# Patient Record
Sex: FEMALE | Race: WHITE
[De-identification: names, ages, dates, MRNs, and addresses within clinical notes are randomized per-mention and may not be internally consistent; named-entity substitution may affect disease eponyms.]

---

## 2023-10-09 ENCOUNTER — HOSPITAL ENCOUNTER (EMERGENCY)
Dept: HOSPITAL 97 - ER | Age: 43
Discharge: HOME | End: 2023-10-09
Payer: COMMERCIAL

## 2023-10-09 VITALS — OXYGEN SATURATION: 100 %

## 2023-10-09 VITALS — TEMPERATURE: 97.7 F

## 2023-10-09 VITALS — SYSTOLIC BLOOD PRESSURE: 112 MMHG | DIASTOLIC BLOOD PRESSURE: 77 MMHG

## 2023-10-09 DIAGNOSIS — R07.89: Primary | ICD-10-CM

## 2023-10-09 DIAGNOSIS — I10: ICD-10-CM

## 2023-10-09 DIAGNOSIS — E03.9: ICD-10-CM

## 2023-10-09 DIAGNOSIS — E27.8: ICD-10-CM

## 2023-10-09 DIAGNOSIS — F41.9: ICD-10-CM

## 2023-10-09 DIAGNOSIS — R11.2: ICD-10-CM

## 2023-10-09 LAB
ALBUMIN SERPL BCP-MCNC: 3.8 G/DL (ref 3.4–5)
ALP SERPL-CCNC: 86 U/L (ref 45–117)
ALT SERPL W P-5'-P-CCNC: 58 U/L (ref 13–56)
AST SERPL W P-5'-P-CCNC: 30 U/L (ref 15–37)
BILIRUB INDIRECT SERPL-MCNC: 0.3 MG/DL (ref 0.2–0.8)
BUN BLD-MCNC: 16 MG/DL (ref 7–18)
GLUCOSE SERPLBLD-MCNC: 141 MG/DL (ref 74–106)
HCT VFR BLD CALC: 39.2 % (ref 36–45)
INR BLD: 1.07
LIPASE SERPL-CCNC: 23 U/L (ref 13–75)
LYMPHOCYTES # SPEC AUTO: 1.7 K/UL (ref 0.7–4.9)
MAGNESIUM SERPL-MCNC: 2 MG/DL (ref 1.6–2.4)
MCV RBC: 82.7 FL (ref 80–100)
NT-PROBNP SERPL-MCNC: < 5 PG/ML (ref ?–125)
PMV BLD: 7.8 FL (ref 7.6–11.3)
POTASSIUM SERPL-SCNC: 4.9 MEQ/L (ref 3.5–5.1)
RBC # BLD: 4.74 M/UL (ref 3.86–4.86)
TROPONIN I SERPL HS-MCNC: 3 PG/ML (ref ?–58.9)
TROPONIN I SERPL HS-MCNC: < 3 PG/ML (ref ?–58.9)
TSH SERPL DL<=0.05 MIU/L-ACNC: 1.97 UIU/ML (ref 0.36–3.74)
WBC # BLD AUTO: 11 THOU/UL (ref 4.3–10.9)

## 2023-10-09 PROCEDURE — 36415 COLL VENOUS BLD VENIPUNCTURE: CPT

## 2023-10-09 PROCEDURE — 83690 ASSAY OF LIPASE: CPT

## 2023-10-09 PROCEDURE — 93005 ELECTROCARDIOGRAM TRACING: CPT

## 2023-10-09 PROCEDURE — 74175 CTA ABDOMEN W/CONTRAST: CPT

## 2023-10-09 PROCEDURE — 80048 BASIC METABOLIC PNL TOTAL CA: CPT

## 2023-10-09 PROCEDURE — 85610 PROTHROMBIN TIME: CPT

## 2023-10-09 PROCEDURE — 83880 ASSAY OF NATRIURETIC PEPTIDE: CPT

## 2023-10-09 PROCEDURE — 96375 TX/PRO/DX INJ NEW DRUG ADDON: CPT

## 2023-10-09 PROCEDURE — 84703 CHORIONIC GONADOTROPIN ASSAY: CPT

## 2023-10-09 PROCEDURE — 80076 HEPATIC FUNCTION PANEL: CPT

## 2023-10-09 PROCEDURE — 83735 ASSAY OF MAGNESIUM: CPT

## 2023-10-09 PROCEDURE — 85025 COMPLETE CBC W/AUTO DIFF WBC: CPT

## 2023-10-09 PROCEDURE — 99284 EMERGENCY DEPT VISIT MOD MDM: CPT

## 2023-10-09 PROCEDURE — 96361 HYDRATE IV INFUSION ADD-ON: CPT

## 2023-10-09 PROCEDURE — 84443 ASSAY THYROID STIM HORMONE: CPT

## 2023-10-09 PROCEDURE — 71275 CT ANGIOGRAPHY CHEST: CPT

## 2023-10-09 PROCEDURE — 96374 THER/PROPH/DIAG INJ IV PUSH: CPT

## 2023-10-09 PROCEDURE — 84484 ASSAY OF TROPONIN QUANT: CPT

## 2023-10-09 NOTE — RAD REPORT
EXAM DESCRIPTION:  CT - Angio  Aorta For Dissection - 10/9/2023 2:02 pm

 

CLINICAL HISTORY:   chest abd pain

 

COMPARISON:  No comparisons

 

TECHNIQUE:  Dynamically enhanced 3 mm thick images of the chest, abdomen, and upper pelvis were obtai
aaron during administration of approximately 100mL Isovue 370 IV contrast. Sagittal and coronal reconst
ructions as well as maximal intensity projection reconstruction were generated and reviewed per an [facility] angiography protocol.

 

All CT scans are performed using dose optimization technique as appropriate and may include automated
 exposure control or mA/KV adjustment according to patient size.

 

FINDINGS:  Aorta is normal in diameter with no dissection or other acute aortic findings. Reconstruct
ion images show no significant findings.

 

Pulmonary arteries are normal as well.

 

No mass or infiltrate in the lung parenchyma. No pleural thickening, pleural effusion or pneumothorax
.

 

No abnormal mediastinal or hilar mass or lymphadenopathy seen. No chest wall mass or abnormal axillar
y lymphadenopathy.

 

Celiac, SMA and renal arteries show no suspicious findings.

 

Colonic diverticulosis. Short segment of mild wall thickening and adjacent fat stranding involving th
e distal descending/ proximal sigmoid colon, see axial images series 401 image 159 through 176. Lobul
ated right adrenal cystic 4.1 x 3.8 cm lesion with marginal calcifications. Small umbilical hernia co
ntaining fat. Retroverted uterus. Some engorgement of the uterine venous plexus particularly on the l
eft. Solid abdominal viscera and bowel show no other significant findings. No mass or abnormal lympha
denopathy.

 

 

IMPRESSION:  No acute abnormalities on CT angiogram of the aorta.

 

Short segment of mild wall thickening and adjacent fat stranding involving the distal descending/ pro
ximal sigmoid colon, may represent segmental colitis or early acute diverticulitis.

 

Other incidental findings as above, including a lobulated right adrenal 4.1 cm cystic lesion some wal
l calcifications, may represent a pseudocyst following prior adrenal hemorrhage, among other etiologi
es, favored to be benign.

## 2023-10-09 NOTE — ER
Nurse's Notes                                                                                     

 [facility]                                                                 

Name: Xiomy Day                                                                                   

Age: 43 yrs                                                                                       

Sex: Female                                                                                       

: 1980                                                                                   

MRN: [id number]                                                                                   

Arrival Date: 10/09/2023                                                                          

Time: 11:35                                                                                       

Account#: [id number]                                                                            

Bed 18                                                                                            

Private MD:                                                                                       

Diagnosis: Chest pain, unspecified;Nausea with vomiting, unspecified;Adrenal cystic lesion        

                                                                                                  

Presentation:                                                                                     

10/09                                                                                             

11:38 Chief complaint: EMS states: toned out to home for sudden onset SOB, chest pain and     eh3 

      pressure, nausea, and drowsiness. Pt vomited yellow bile en route to hospital. Recently     

      started Valsartan for HTN, and started Mounjaro and Phentermine within the last week.       

      Coronavirus screen: Vaccine status: Patient reports receiving the 2nd dose of the covid     

      vaccine. Ebola Screen: No symptoms or risks identified at this time. Initial Sepsis         

      Screen: Does the patient meet any 2 criteria? No. Patient's initial sepsis screen is        

      negative. Does the patient have a suspected source of infection? No. Patient's initial      

      sepsis screen is negative. Initial Sepsis Screen:. Risk Assessment: Do you want to hurt     

      yourself or someone else? Patient reports no desire to harm self or others. Onset of        

      symptoms was 2023.                                                              

11:38 Method Of Arrival: EMS: [facility]                                                       [facility] 

11:38 Acuity: MYESHA 2                                                                           eh3 

                                                                                                  

Triage Assessment:                                                                                

11:38 General: Appears distressed, uncomfortable, Behavior is cooperative, anxious, restless. eh3 

      Pain: Complains of pain in chest Pain does not radiate. Pain currently is 10 out of 10      

      on a pain scale. Quality of pain is described as heavy, pressure, Pain began suddenly,      

      30 min ago. Is continuous, Noted to be restless. Neuro: Level of Consciousness is           

      awake, alert, obeys commands, Oriented to person, place, time, situation, Speech is         

      normal, Facial symmetry appears normal, Pupils are PERRLA. Cardiovascular: Reports          

      chest pain, fatigue, lightheadedness, nausea, shortness of breath, vomiting, Capillary      

      refill < 3 seconds Patient's skin is warm and dry. Rhythm is sinus rhythm. Respiratory:     

      Reports shortness of breath at rest pain with respiration Airway is patent Trachea          

      midline Respiratory effort is even, labored, shallow, Respiratory pattern is regular,       

      symmetrical, Breath sounds are clear bilaterally. Onset: The symptoms/episode               

      began/occurred just prior to arrival, the patient has moderate shortness of breath. GI:     

      Abdomen is round non-distended, Reports nausea, vomiting. : No signs and/or symptoms      

      were reported regarding the genitourinary system. Derm: Skin is intact, is healthy with     

      good turgor, Skin is diaphoretic, Skin is normal, Skin temperature is warm.                 

      Musculoskeletal: Circulation, motion, and sensation intact. Range of motion: intact in      

      all extremities.                                                                            

                                                                                                  

Historical:                                                                                       

- Allergies:                                                                                      

11:38 No Known Allergies;                                                                     eh3 

- Home Meds:                                                                                      

11:38 valsartan oral [Active]; Mounjaro subcutaneous [Active]; phentermine oral [Active];     eh3 

      Synthroid Oral [Active];                                                                    

- PMHx:                                                                                           

11:38 Hypertensive disorder; Hypothyroidism; Anxiety;                                         eh3 

                                                                                                  

- Immunization history:: Adult Immunizations up to date.                                          

- Social history:: Smoking status: Patient denies any tobacco usage or history of.                

  Patient/guardian denies using alcohol.                                                          

                                                                                                  

                                                                                                  

Screenin:38 [facility] ED Fall Risk Assessment (Adult) Score/Fall Risk Level 0 - 2 = Low Risk. Abuse  eh3 

      screen: Denies threats or abuse. Denies injuries from another. Nutritional screening:       

      No deficits noted. Tuberculosis screening: No symptoms or risk factors identified.          

                                                                                                  

Assessment:                                                                                       

11:38 Reassessment: No changes from previously documented assessment. See triage assessment.  eh3 

      Cardiovascular: Rhythm is sinus rhythm.                                                     

12:00 Reassessment: Patient and/or family updated on plan of care and expected duration. Pain eh3 

      level reassessed. Pt A\T\O x 3, equal labored respirations, skin warm/dry/pink.             

12:30 Reassessment: Pt A\T\O x 3, equal labored respirations, skin warm/dry/pink.               eh3

13:00 Reassessment: Patient and/or family updated on plan of care and expected duration. Pain eh3 

      level reassessed. Patient is alert, oriented x 3, equal unlabored respirations, skin        

      warm/dry/pink.                                                                              

13:24 Reassessment: Pt sleeping, eyes closed, respirations even and unlabored, skin           eh3 

      pink/warm/dry. SpO2 88% on RA. Pt placed on 2L O2 via NC.                                   

14:00 Reassessment: Patient appears in no apparent distress at this time. Patient and/or      eh3 

      family updated on plan of care and expected duration. Pain level reassessed. Patient is     

      alert, oriented x 3, equal unlabored respirations, skin warm/dry/pink.                      

14:30 Reassessment: Patient appears in no apparent distress at this time. Patient and/or      3 

      family updated on plan of care and expected duration. Pain level reassessed. Patient is     

      alert, oriented x 3, equal unlabored respirations, skin warm/dry/pink. Patient states       

      feeling better. Patient states symptoms have improved.                                      

15:30 Reassessment: Patient appears in no apparent distress at this time. Patient and/or      3 

      family updated on plan of care and expected duration. Pain level reassessed. Patient is     

      alert, oriented x 3, equal unlabored respirations, skin warm/dry/pink.                      

16:30 Reassessment: Patient appears in no apparent distress at this time. Patient and/or      3 

      family updated on plan of care and expected duration. Pain level reassessed. Patient is     

      alert, oriented x 3, equal unlabored respirations, skin warm/dry/pink.                      

                                                                                                  

Vital Signs:                                                                                      

11:38  / 87; Pulse 95; Resp 19; Temp 97.7(O); Pulse Ox 100% on R/A; Weight 81.65 kg;    [facility] 

      Height 5 ft. 6 in. ; Pain 10/10;                                                            

12:00  / 87; Pulse 90; Resp 19; Pulse Ox 96% on R/A;                                    [facility] 

12:30  / 72; Pulse 95; Resp 18; Pulse Ox 100% on R/A;                                   [facility] 

13:00  / 94; Pulse 91; Resp 13; Pulse Ox 100% on R/A;                                   [facility] 

13:30  / 70; Pulse 91; Resp 20; Pulse Ox 100% on 2 lpm NC;                              [facility] 

14:00  / 80; Pulse 98; Resp 14; Pulse Ox 100% on 2 lpm NC;                              [facility] 

14:30  / 72; Pulse 100; Resp 18; Pulse Ox 100% on 2 lpm NC;                             [facility] 

15:00  / 70; Pulse 98; Resp 14; Pulse Ox 96% on R/A;                                    [facility] 

15:30  / 74; Pulse 102; Resp 17; Pulse Ox 99% on R/A;                                   3 

16:00  / 78; Pulse 98; Resp 16; Pulse Ox 100% on R/A;                                   eh3 

16:30  / 77; Pulse 94; Resp 16; Pulse Ox 100% on R/A;                                   eh3 

11:38 Body Mass Index 29.05 (81.65 kg, 167.64 cm)                                             eh3 

11:38 Pain Scale: Adult                                                                       eh3 

                                                                                                  

ED Course:                                                                                        

11:38 Patient arrived in ED.                                                                  rt  

11:38 Arm band placed on.                                                                     eh3 

11:38 Patient has correct armband on for positive identification. Bed in low position. Call   eh3 

      light in reach. Side rails up X2. Provided Education on: Use of call bell. Client           

      placed on continuous cardiac and pulse oximetry monitoring. NIBP monitoring applied.        

      Door closed. Noise minimized. Lights dimmed.                                                

11:38 Maintain EMS IV. Dressing intact. Good blood return noted. Site clean \T\ dry. Gauge \T\    eh
3

      site: 20g LAC.                                                                              

11:40 Chip Atkins MD is Attending Physician.                                            rt  

11:58 Aylin Bauer, RN is Primary Nurse.                                                        eh3 

12:06 Triage completed.                                                                       eh3 

14:02 CT Aorta for Dissection In Process Unspecified.                                         [facility]

16:28 Lloyd Warren MD is Referral Physician.                                               rt  

16:30 No provider procedures requiring assistance completed.                                  eh3 

16:42 IV discontinued, intact, bleeding controlled, No redness/swelling at site. Pressure     eh3 

      dressing applied.                                                                           

                                                                                                  

Administered Medications:                                                                         

11:50 Drug: Nitroglycerin Sublingual 0.4 mg Sublingual once; every five minute if needed x3   eh3 

      Route: Sublingual;                                                                          

11:55 Drug: Ondansetron IVP 4 mg IVP once; over 2 minutes Route: IVP; Site: left antecubital; eh3 

12:30 Follow up: Response: No adverse reaction; Nausea unchanged                              eh3 

11:55 Drug: Nitroglycerin Sublingual 0.4 mg Sublingual once; every five minute if needed x3   eh3 

      {Note: Pain 7/10.} Route: Sublingual;                                                       

12:00 Drug: Nitroglycerin Sublingual 0.4 mg Sublingual once; every five minute if needed x3   eh3 

      {Note: Pain 7/10.} Route: Sublingual;                                                       

12:10 Follow up: Response: No adverse reaction; Pain is unchanged, physician notified         eh3 

12:22 Drug: morphine IVP or IV 4 mg IVP once over 4 mins Route: IVP; Infused Over: 4 mins;    eh3 

      Site: left antecubital;                                                                     

12:30 Follow up: Response: No adverse reaction; Pain is unchanged, physician notified; RASS:  eh3 

      Restless (+1)                                                                               

12:59 Drug: NS 0.9% IV 1000 ml IV at 1 bolus Per protocol; 1000 mL bolus Route: IV; Rate: 1   eh3 

      bolus; Site: left antecubital;                                                              

15:00 Follow up: IV Status: Completed infusion; IV Intake: 1000ml                             eh3 

12:59 Drug: Ativan IVP 1 mg IVP once Route: IVP; Site: left antecubital;                      eh3 

13:24 Follow up: Response: No adverse reaction; RASS: Light sedation (-2)                     eh3 

12:59 Drug: Ondansetron IVP 4 mg IVP once; over 2 minutes Route: IVP; Site: left antecubital; eh3 

13:24 Follow up: Response: No adverse reaction                                                eh3 

                                                                                                  

                                                                                                  

Medication:                                                                                       

16:30 VIS not applicable for this client.                                                     eh3 

                                                                                                  

Intake:                                                                                           

15:00 IV: 1000ml; Total: 1000ml.                                                              3 

                                                                                                  

Outcome:                                                                                          

16:29 Discharge ordered by MD.                                                                rt  

16:42 Discharged to home ambulatory, with significant other,                                  [facility] 

16:42 Condition: stable                                                                           

16:42 Discharge instructions given to patient, significant other, Instructed on discharge         

      instructions, follow up and referral plans. medication usage, Demonstrated                  

      understanding of instructions, follow-up care, medications, Prescriptions given X 2,        

16:43 Patient left the ED.                                                                    3 

                                                                                                  

Signatures:                                                                                       

Dispatcher [employer]                           Aylin Maravilla RN RN   3                                                  

Chip Atkins MD MD   rt                                                   

                                                                                                  

Corrections: (The following items were deleted from the chart)                                    

16:30 16:29 Reassessment: Patient appears in no apparent distress at this time. Patient       eh3 

      and/or family updated on plan of care and expected duration. Pain level reassessed.         

      Patient is alert, oriented x 3, equal unlabored respirations, skin warm/dry/pink. eh3       

                                                                                                  

**************************************************************************************************

## 2023-10-09 NOTE — EDPHYS
Physician Documentation                                                                           

 [facility]                                                                 

Name: Xiomy Day                                                                                   

Age: 43 yrs                                                                                       

Sex: Female                                                                                       

: 1980                                                                                   

MRN: [id number]                                                                                   

Arrival Date: 10/09/2023                                                                          

Time: 11:35                                                                                       

Account#: [id number]                                                                            

Bed 18                                                                                            

Private MD:                                                                                       

ED Physician Chip Atkins                                                                     

HPI:                                                                                              

10/09                                                                                             

12:02 This 43 yrs old Female presents to ER via Unassigned with complaints of Shortness Of    rt  

      Breath, Chest Pain.                                                                         

12:02 Patient presents to the ED with chest pain, shortness of breath, abdominal pain, nausea rt  

      and vomiting. She first noticed symptoms about 8 this morning which an acute onset of a     

      substernal chest pressure. She had associated shortness of breath. She subsequently         

      developed upper abdominal pain as well as nausea and vomiting that has somewhat             

      improved after having an episode of vomiting. Still has some mild left upper quadrant       

      pain as well as some nausea. Reports a tingling to her hands, feet. Denies other acute      

      complaints at this time, symptoms are moderate severity, no other aggravating or            

      leaving factors..                                                                           

                                                                                                  

Historical:                                                                                       

- Allergies:                                                                                      

11:38 No Known Allergies;                                                                     eh3 

- Home Meds:                                                                                      

11:38 valsartan oral [Active]; Mounjaro subcutaneous [Active]; phentermine oral [Active];     eh3 

      Synthroid Oral [Active];                                                                    

- PMHx:                                                                                           

11:38 Hypertensive disorder; Hypothyroidism; Anxiety;                                         eh3 

                                                                                                  

- Immunization history:: Adult Immunizations up to date.                                          

- Social history:: Smoking status: Patient denies any tobacco usage or history of.                

  Patient/guardian denies using alcohol.                                                          

                                                                                                  

                                                                                                  

ROS:                                                                                              

12:02 Constitutional: Negative for fever, chills, and weight loss, Neck: Negative for injury, rt  

      pain, and swelling, MS/Extremity: Negative for injury and deformity, Skin: Negative for     

      injury, rash, and discoloration, Neuro: Negative for headache, weakness, numbness,          

      tingling, and seizure, Psych: Negative for depression, anxiety, suicide ideation,           

      homicidal ideation, and hallucinations,                                                     

12:02 Cardiovascular: Positive for chest pain, Negative for edema,                                

12:02 Respiratory: Positive for shortness of breath, Negative for cough,                          

12:02 Abdomen/GI: Positive for abdominal pain, nausea and vomiting,                               

                                                                                                  

Exam:                                                                                             

12:02 Constitutional:  This is a well developed, well nourished patient who is awake, alert,  rt  

      and in no acute distress. Head/Face:  Normocephalic, atraumatic. Chest/axilla:  Normal      

      chest wall appearance and motion.  Nontender with no deformity.  No lesions are             

      appreciated. Cardiovascular:  Regular rate and rhythm with a normal S1 and S2.  No          

      gallops, murmurs, or rubs.  Normal PMI, no JVD.  No pulse deficits. Respiratory:  Lungs     

      have equal breath sounds bilaterally, clear to auscultation and percussion.  No rales,      

      rhonchi or wheezes noted.  No increased work of breathing, no retractions or nasal          

      flaring. Abdomen/GI:  Soft, non-tender, with normal bowel sounds.  No distension or         

      tympany.  No guarding or rebound.  No evidence of tenderness throughout. Skin:  Warm,       

      dry with normal turgor.  Normal color with no rashes, no lesions, and no evidence of        

      cellulitis. MS/ Extremity:  Pulses equal, no cyanosis.  Neurovascular intact.  Full,        

      normal range of motion. Neuro:  Awake and alert, GCS 15, oriented to person, place,         

      time, and situation.  Cranial nerves II-XII grossly intact.  Motor strength 5/5 in all      

      extremities.  Sensory grossly intact.  Cerebellar exam normal.  Normal gait. Psych:         

      Awake, alert, with orientation to person, place and time.  Behavior, mood, and affect       

      are within normal limits.                                                                   

12:02 ECG was reviewed by the Attending Physician.                                                

12:52 ECG was reviewed by the Attending Physician.                                            rt  

                                                                                                  

Vital Signs:                                                                                      

11:38  / 87; Pulse 95; Resp 19; Temp 97.7(O); Pulse Ox 100% on R/A; Weight 81.65 kg;    3 

      Height 5 ft. 6 in. ; Pain 10/10;                                                            

12:00  / 87; Pulse 90; Resp 19; Pulse Ox 96% on R/A;                                    3 

12:30  / 72; Pulse 95; Resp 18; Pulse Ox 100% on R/A;                                   3 

13:00  / 94; Pulse 91; Resp 13; Pulse Ox 100% on R/A;                                   eh3 

13:30  / 70; Pulse 91; Resp 20; Pulse Ox 100% on 2 lpm NC;                              eh3 

14:00  / 80; Pulse 98; Resp 14; Pulse Ox 100% on 2 lpm NC;                              eh3 

14:30  / 72; Pulse 100; Resp 18; Pulse Ox 100% on 2 lpm NC;                             eh3 

15:00  / 70; Pulse 98; Resp 14; Pulse Ox 96% on R/A;                                    eh3 

15:30  / 74; Pulse 102; Resp 17; Pulse Ox 99% on R/A;                                   eh3 

16:00  / 78; Pulse 98; Resp 16; Pulse Ox 100% on R/A;                                   eh3 

16:30  / 77; Pulse 94; Resp 16; Pulse Ox 100% on R/A;                                   eh3 

11:38 Body Mass Index 29.05 (81.65 kg, 167.64 cm)                                             [facility] 

11:38 Pain Scale: Adult                                                                       3 

                                                                                                  

MDM:                                                                                              

11:38 Patient medically screened.                                                             rt  

12:41 ED course: Symptoms still not improved, will try Ativan and Zofran as well as fluids.   rt  

      Will repeat EKG..                                                                           

15:16 ED course: Had a long discussion with the patient regarding the results. She was        rt  

      informed of the findings of a cystic lesion on her adrenal and and told to follow-up        

      with her primary care provider for further evaluation of this. Patient was also             

      informed of mild inflammatory changes about the sigmoid colon, states that she has had      

      diverticulitis in the past but this does feel different. Patient states that her            

      symptoms have significantly improved currently. Discussed findings of mild tachycardia      

      with the patient as well. I did offer the patient admission to the hospital. Patient        

      states that she strongly desires discharge. Shared decision-making was employed             

      regarding risk and benefits. We will repeat troponin, send patient home with a              

      wait-and-see antibiotic prescription for possible early diverticulitis, she will return     

      to the ER at anytime for new or worsening symptoms and will follow-up with cardiology       

      as an outpatient..                                                                          

16:34 Differential diagnosis: Acute coronary syndrome, PE, anxiety reaction, pneumonia,       rt  

      pneumothorax, diverticulitis. Data reviewed: vital signs, nurses notes, lab test            

      result(s), EKG, radiologic studies. Consideration of Admission/Observation Escalation       

      of care including admission/observation considered. I considered the following              

      discharge prescriptions or medication management in the emergency department                

      Medications were administered in the Emergency Department. See MAR. Independent             

      interpretation of the following test(s) in the Emergency Department CT Scan: My             

      interpretation is No bowel obstruction some interpretation of CT scan images. Care          

      significantly affected by the following chronic conditions: Hypertension. Counseling: I     

      had a detailed discussion with the patient and/or guardian regarding the historical         

      points, exam findings, and any diagnostic results supporting the discharge/admit            

      diagnosis, lab results, radiology results, the need for outpatient follow up, to return     

      to the emergency department if symptoms worsen or persist or if there are any questions     

      or concerns that arise at home. Response to treatment: the patient's symptoms have          

      markedly improved after treatment.                                                          

                                                                                                  

10/09                                                                                             

11:40 Order name: Basic Metabolic Panel; Complete Time: 12:38                                 rt  

10/09                                                                                             

11:40 Order name: CBC with Diff; Complete Time: 12:38                                         rt  

10/09                                                                                             

11:40 Order name: LFT's; Complete Time: 12:38                                                 rt  

10/09                                                                                             

11:40 Order name: Magnesium; Complete Time: 12:38                                             rt  

10/09                                                                                             

11:40 Order name: NT PRO-BNP; Complete Time: 12:38                                            rt  

10/09                                                                                             

11:40 Order name: PT-INR; Complete Time: 12:38                                                rt  

10/09                                                                                             

11:40 Order name: Troponin HS; Complete Time: 12:38                                           rt  

10/09                                                                                             

11:40 Order name: Lipase; Complete Time: 12:38                                                rt  

10/09                                                                                             

11:40 Order name: Pregnancy Test, Serum; Complete Time: 14:12                                 rt  

10/09                                                                                             

15:08 Order name: Troponin High Sensitivity; Complete Time: 16:25                             eh3 

10/09                                                                                             

15:09 Order name: TSH; Complete Time: 16:25                                                   rt  

10/09                                                                                             

11:40 Order name: CT Aorta for Dissection; Complete Time: 14:47                               rt  

10/09                                                                                             

11:40 Order name: EKG; Complete Time: 11:41                                                   rt  

10/09                                                                                             

12:38 Order name: EKG; Complete Time: 12:39                                                   rt  

10/09                                                                                             

11:40 Order name: Cardiac monitoring; Complete Time: 11:46                                    rt  

10/09                                                                                             

11:40 Order name: EKG - Nurse/Tech; Complete Time: 11:46                                      rt  

10/09                                                                                             

11:40 Order name: IV Saline Lock; Complete Time: 11:46                                        rt  

10/09                                                                                             

11:40 Order name: Labs collected and sent; Complete Time: 11:59                               rt  

10/09                                                                                             

11:40 Order name: O2 Per Protocol; Complete Time: 11:46                                       rt  

10/09                                                                                             

11:40 Order name: O2 Sat Monitoring; Complete Time: 11:46                                     rt  

10/09                                                                                             

12:38 Order name: EKG - Nurse/Tech; Complete Time: 12:50                                      rt  

                                                                                                  

EC:02 Rate is 94 beats/min. Rhythm is regular, Normal Sinus Rhythm with No ectopy. QRS Axis   rt  

      is Normal. OH interval is normal. QRS interval is normal. QT interval is normal. No Q       

      waves. T waves are Normal. No ST changes noted.                                             

12:52 Rate is 93 beats/min. Rhythm is regular, Normal Sinus Rhythm with No ectopy. QRS Axis   rt  

      is Normal. OH interval is normal. QRS interval is normal. QT interval is normal. No Q       

      waves. T waves are Normal. No ST changes noted. Interpreted by me.                          

                                                                                                  

Administered Medications:                                                                         

11:50 Drug: Nitroglycerin Sublingual 0.4 mg Sublingual once; every five minute if needed x3   eh3 

      Route: Sublingual;                                                                          

11:55 Drug: Ondansetron IVP 4 mg IVP once; over 2 minutes Route: IVP; Site: left antecubital; 3 

12:30 Follow up: Response: No adverse reaction; Nausea unchanged                              eh3 

11:55 Drug: Nitroglycerin Sublingual 0.4 mg Sublingual once; every five minute if needed x3   eh3 

      {Note: Pain 7/10.} Route: Sublingual;                                                       

12:00 Drug: Nitroglycerin Sublingual 0.4 mg Sublingual once; every five minute if needed x3   eh3 

      {Note: Pain 7/10.} Route: Sublingual;                                                       

12:10 Follow up: Response: No adverse reaction; Pain is unchanged, physician notified         3 

12:22 Drug: morphine IVP or IV 4 mg IVP once over 4 mins Route: IVP; Infused Over: 4 mins;    eh3 

      Site: left antecubital;                                                                     

12:30 Follow up: Response: No adverse reaction; Pain is unchanged, physician notified; RASS:  eh3 

      Restless (+1)                                                                               

12:59 Drug: NS 0.9% IV 1000 ml IV at 1 bolus Per protocol; 1000 mL bolus Route: IV; Rate: 1   eh3 

      bolus; Site: left antecubital;                                                              

15:00 Follow up: IV Status: Completed infusion; IV Intake: 1000ml                             eh3 

12:59 Drug: Ativan IVP 1 mg IVP once Route: IVP; Site: left antecubital;                      eh3 

13:24 Follow up: Response: No adverse reaction; RASS: Light sedation (-2)                     eh3 

12:59 Drug: Ondansetron IVP 4 mg IVP once; over 2 minutes Route: IVP; Site: left antecubital; 3 

13:24 Follow up: Response: No adverse reaction                                                eh3 

                                                                                                  

                                                                                                  

Disposition Summary:                                                                              

10/09/23 16:29                                                                                    

Discharge Ordered                                                                                 

 Notes:       Location: Home                                                                        
  rt

      Problem: new                                                                            rt  

      Symptoms: have improved                                                                 rt  

      Condition: Stable                                                                       rt  

      Diagnosis                                                                                   

        - Chest pain, unspecified                                                             rt  

        - Nausea with vomiting, unspecified                                                   rt  

        - Adrenal cystic lesion                                                               rt  

      Followup:                                                                               rt  

        - With: Lloyd Warren MD                                                                 

        - When: 2 - 3 days                                                                         

        - Reason:                                                                                  

      Followup:                                                                               rt  

        - With: Emergency Department                                                               

        - When: As needed                                                                          

        - Reason: Worsening of condition                                                           

      Discharge Instructions:                                                                     

        - Discharge Summary Sheet                                                             rt  

        - Nonspecific Chest Pain, Adult                                                       rt  

        - Diverticulitis                                                                      rt  

      Forms:                                                                                      

        - Medication Reconciliation Form                                                      rt  

        - Thank You Letter                                                                    rt  

        - Antibiotic Education                                                                rt  

        - Prescription Opioid Use                                                             rt  

        - Patient Portal Instructions                                                         rt  

        - Leadership Thank You Letter                                                         rt  

      Prescriptions:                                                                              

        - ondansetron 4 mg Oral Tablet,disintegrating                                              

            - take 1 tablet ORAL route every 6 hours for 5 days; 18 tablet; Refills: 0,       rt  

      Product Selection Permitted                                                                 

        - Augmentin 875-125 mg Oral Tablet                                                         

            - take 1 tablet ORAL route every 12 hours for 10 days; 20 tablet; Refills: 0,     rt  

      Product Selection Permitted                                                                 

Signatures:                                                                                       

Dispatcher [employer]                           Aylin Maravilla RN                          RN   eh3                                                  

Chip Atkins MD MD   rt                                                   

                                                                                                  

**************************************************************************************************

## 2023-10-10 NOTE — EKG
Test Date:    2023-10-09               Test Time:    12:48:05

Technician:   VERONIKA                                    

                                                     

MEASUREMENT RESULTS:                                       

Intervals:                                           

Rate:         93                                     

PA:           152                                    

QRSD:         68                                     

QT:           358                                    

QTc:          445                                    

Axis:                                                

P:            71                                     

PA:           152                                    

QRS:          48                                     

T:            57                                     

                                                     

INTERPRETIVE STATEMENTS:                                       

                                                     

Normal sinus rhythm

Low voltage QRS

Borderline ECG

Compared to ECG 10/09/2023 11:43:22

No significant changes



Electronically Signed On 10-10-23 11:40:44 CDT by Lloyd Warren

## 2023-10-10 NOTE — EKG
Test Date:    2023-10-09               Test Time:    11:43:22

Technician:   VERONIKA                                    

                                                     

MEASUREMENT RESULTS:                                       

Intervals:                                           

Rate:         94                                     

MI:           158                                    

QRSD:         68                                     

QT:           354                                    

QTc:          442                                    

Axis:                                                

P:            70                                     

MI:           158                                    

QRS:          47                                     

T:            57                                     

                                                     

INTERPRETIVE STATEMENTS:                                       

                                                     

Normal sinus rhythm

Low voltage QRS

Borderline ECG

Compared to ECG 01/15/2016 14:18:27

Sinus tachycardia no longer present



Electronically Signed On 10-10-23 11:40:51 CDT by Lloyd Warren